# Patient Record
Sex: FEMALE | Race: WHITE | NOT HISPANIC OR LATINO | Employment: FULL TIME | URBAN - METROPOLITAN AREA
[De-identification: names, ages, dates, MRNs, and addresses within clinical notes are randomized per-mention and may not be internally consistent; named-entity substitution may affect disease eponyms.]

---

## 2021-03-08 ENCOUNTER — HOSPITAL ENCOUNTER (EMERGENCY)
Facility: HOSPITAL | Age: 31
Discharge: HOME | End: 2021-03-08
Attending: EMERGENCY MEDICINE
Payer: COMMERCIAL

## 2021-03-08 VITALS
RESPIRATION RATE: 20 BRPM | DIASTOLIC BLOOD PRESSURE: 71 MMHG | SYSTOLIC BLOOD PRESSURE: 109 MMHG | OXYGEN SATURATION: 99 % | HEART RATE: 89 BPM | TEMPERATURE: 98.4 F

## 2021-03-08 DIAGNOSIS — M79.5 FOREIGN BODY (FB) IN SOFT TISSUE: ICD-10-CM

## 2021-03-08 DIAGNOSIS — L03.90 CELLULITIS: Primary | ICD-10-CM

## 2021-03-08 PROCEDURE — 99284 EMERGENCY DEPT VISIT MOD MDM: CPT | Performed by: EMERGENCY MEDICINE

## 2021-03-08 PROCEDURE — 99283 EMERGENCY DEPT VISIT LOW MDM: CPT

## 2021-03-08 PROCEDURE — 10120 INC&RMVL FB SUBQ TISS SMPL: CPT | Performed by: EMERGENCY MEDICINE

## 2021-03-08 RX ORDER — CEPHALEXIN 500 MG/1
500 CAPSULE ORAL 4 TIMES DAILY
Qty: 20 CAPSULE | Refills: 0 | Status: SHIPPED | OUTPATIENT
Start: 2021-03-08 | End: 2021-03-13

## 2021-03-08 RX ORDER — ETONOGESTREL AND ETHINYL ESTRADIOL 11.7; 2.7 MG/1; MG/1
1 INSERT, EXTENDED RELEASE VAGINAL
COMMUNITY

## 2021-03-08 RX ORDER — LIDOCAINE HYDROCHLORIDE 10 MG/ML
1 INJECTION, SOLUTION EPIDURAL; INFILTRATION; INTRACAUDAL; PERINEURAL ONCE
Status: COMPLETED | OUTPATIENT
Start: 2021-03-08 | End: 2021-03-08

## 2021-03-08 RX ORDER — FLUOXETINE HYDROCHLORIDE 20 MG/1
20 CAPSULE ORAL DAILY
COMMUNITY
Start: 2021-03-05 | End: 2021-06-03

## 2021-03-08 RX ORDER — LIDOCAINE 40 MG/G
CREAM TOPICAL ONCE
Status: COMPLETED | OUTPATIENT
Start: 2021-03-08 | End: 2021-03-08

## 2021-03-08 RX ORDER — LORAZEPAM 0.5 MG/1
0.5 TABLET ORAL ONCE
Status: COMPLETED | OUTPATIENT
Start: 2021-03-08 | End: 2021-03-08

## 2021-03-08 RX ORDER — BACITRACIN, NEOMYCIN, POLYMYXIN B 400; 3.5; 5 [USP'U]/G; MG/G; [USP'U]/G
1 OINTMENT TOPICAL ONCE
Status: COMPLETED | OUTPATIENT
Start: 2021-03-08 | End: 2021-03-08

## 2021-03-08 RX ORDER — FLUOXETINE 10 MG/1
10 CAPSULE ORAL DAILY
COMMUNITY
Start: 2020-12-01

## 2021-03-08 RX ORDER — CEPHALEXIN 250 MG/1
500 CAPSULE ORAL ONCE
Status: COMPLETED | OUTPATIENT
Start: 2021-03-08 | End: 2021-03-08

## 2021-03-08 RX ADMIN — LIDOCAINE HYDROCHLORIDE 1 ML: 10 INJECTION, SOLUTION EPIDURAL; INFILTRATION; INTRACAUDAL; PERINEURAL at 02:52

## 2021-03-08 RX ADMIN — LIDOCAINE: 4 CREAM TOPICAL at 02:52

## 2021-03-08 RX ADMIN — LORAZEPAM 0.5 MG: 0.5 TABLET ORAL at 03:18

## 2021-03-08 RX ADMIN — BACITRACIN, NEOMYCIN, POLYMYXIN B 1 SMALL APPLICATION: 400; 3.5; 5 OINTMENT TOPICAL at 04:03

## 2021-03-08 RX ADMIN — CEPHALEXIN 500 MG: 250 CAPSULE ORAL at 04:02

## 2021-03-08 NOTE — DISCHARGE INSTRUCTIONS
Wash wound twice daily with gentle soap and water  Apply bacitracin or neosporin twice daily  Keep covered with bandaid/gauze if possible  Get reevaluated in 3-5 days for wound check, sooner if you feel symptoms are worsening

## 2024-06-15 ENCOUNTER — HOSPITAL ENCOUNTER (EMERGENCY)
Facility: HOSPITAL | Age: 34
Discharge: HOME | End: 2024-06-15
Attending: EMERGENCY MEDICINE | Admitting: EMERGENCY MEDICINE
Payer: COMMERCIAL

## 2024-06-15 VITALS
RESPIRATION RATE: 18 BRPM | DIASTOLIC BLOOD PRESSURE: 75 MMHG | SYSTOLIC BLOOD PRESSURE: 127 MMHG | TEMPERATURE: 98.2 F | HEART RATE: 80 BPM | OXYGEN SATURATION: 95 %

## 2024-06-15 DIAGNOSIS — Z77.098 CHEMICAL EXPOSURE OF EYE: Primary | ICD-10-CM

## 2024-06-15 PROCEDURE — 99283 EMERGENCY DEPT VISIT LOW MDM: CPT

## 2024-06-15 PROCEDURE — 99284 EMERGENCY DEPT VISIT MOD MDM: CPT | Performed by: PHYSICIAN ASSISTANT

## 2024-06-15 RX ORDER — ERYTHROMYCIN 5 MG/G
0.5 OINTMENT OPHTHALMIC ONCE
Status: COMPLETED | OUTPATIENT
Start: 2024-06-15 | End: 2024-06-15

## 2024-06-15 RX ORDER — TETRACAINE HYDROCHLORIDE 5 MG/ML
1 SOLUTION OPHTHALMIC ONCE
Status: COMPLETED | OUTPATIENT
Start: 2024-06-15 | End: 2024-06-15

## 2024-06-15 RX ADMIN — FLUORESCEIN SODIUM 1 STRIP: 1 STRIP OPHTHALMIC at 19:07

## 2024-06-15 RX ADMIN — ERYTHROMYCIN 0.5 INCH: 5 OINTMENT OPHTHALMIC at 19:29

## 2024-06-15 RX ADMIN — TETRACAINE HYDROCHLORIDE 1 DROP: 5 SOLUTION OPHTHALMIC at 19:07

## 2024-06-15 NOTE — ED PROVIDER NOTES
History  Chief Complaint   Patient presents with    Eye Pain     Patient presents to ED c/o left eye pain and redness after getting castor oil in eye while trying to take extensions out.      Patient is a 34 yo wf with history of anxiety who reports b/l eye irritation described as burning sensation, L >R, beginning last night. States she was using castor oil to take off eyelid extensions when she may have gotten castor oil in the eyes. States she has been irrigating intermittently since. Wears contact lenses but has not since this occurred. No visual disturbance. No photophobia.         Prior to Admission Medications   Prescriptions Last Dose Informant Patient Reported? Taking?   FLUoxetine (PROzac) 10 mg capsule   Yes No   Sig: Take 10 mg by mouth daily 1 po qd in addition to 20 mg pill   etonogestrel-ethinyl estradiol (NUVARING) 0.12-0.015 MG/24HR vaginal ring   Yes No   Sig: Insert 1 each into the vagina every 21 days      Facility-Administered Medications: None       Past Medical History:   Diagnosis Date    Anxiety        Past Surgical History:   Procedure Laterality Date    DILATION AND CURETTAGE OF UTERUS  2024       History reviewed. No pertinent family history.  I have reviewed and agree with the history as documented.    E-Cigarette/Vaping    E-Cigarette Use Never User      E-Cigarette/Vaping Substances    Nicotine No     THC No     CBD No     Flavoring No     Other No     Unknown No      Social History     Tobacco Use    Smoking status: Never    Smokeless tobacco: Never   Vaping Use    Vaping status: Never Used   Substance Use Topics    Alcohol use: Yes     Comment: social    Drug use: Not Currently       Review of Systems   Constitutional:  Negative for fever.   Eyes:  Positive for redness. Negative for photophobia, discharge, itching and visual disturbance.       Physical Exam  Physical Exam  Vitals and nursing note reviewed.   Constitutional:       General: She is not in acute distress.     Appearance:  Normal appearance. She is not ill-appearing, toxic-appearing or diaphoretic.      Comments: Anxious    HENT:      Head: Normocephalic and atraumatic.      Right Ear: Tympanic membrane, ear canal and external ear normal.      Left Ear: Tympanic membrane and ear canal normal.      Nose: Nose normal.      Mouth/Throat:      Mouth: Mucous membranes are moist.      Pharynx: Oropharynx is clear.   Eyes:      Extraocular Movements: Extraocular movements intact.      Pupils: Pupils are equal, round, and reactive to light.      Comments: Mildly injected L conjuctiva  Neg fluorescein uptake b/l  pH: 7.4 both eyes.    Cardiovascular:      Rate and Rhythm: Normal rate and regular rhythm.      Heart sounds: Normal heart sounds.   Pulmonary:      Effort: Pulmonary effort is normal.      Breath sounds: Normal breath sounds.   Musculoskeletal:      Cervical back: Normal range of motion and neck supple.   Neurological:      Mental Status: She is alert.         Vital Signs  ED Triage Vitals [06/15/24 1849]   Temperature Pulse Respirations Blood Pressure SpO2   98.2 °F (36.8 °C) 80 18 127/75 95 %      Temp Source Heart Rate Source Patient Position - Orthostatic VS BP Location FiO2 (%)   Oral Monitor Sitting Right arm --      Pain Score       --           Vitals:    06/15/24 1849   BP: 127/75   Pulse: 80   Patient Position - Orthostatic VS: Sitting         Visual Acuity  Visual Acuity      Flowsheet Row Most Recent Value   Visual acuity R eye is 20/20   Visual acuity Left eye is 20/20   Visual acuity in both eyes is 20/20   Wearing corrective eyewear/lenses? Yes            ED Medications  Medications   fluorescein sodium sterile ophthalmic strip 1 strip (1 strip Both Eyes Given 6/15/24 1907)   tetracaine 0.5 % ophthalmic solution 1 drop (1 drop Both Eyes Given 6/15/24 1907)   erythromycin (ILOTYCIN) 0.5 % ophthalmic ointment 0.5 inch (0.5 inches Both Eyes Given 6/15/24 1929)       Diagnostic Studies  Results Reviewed       None                    No orders to display              Procedures  Procedures         ED Course                               SBIRT 20yo+      Flowsheet Row Most Recent Value   Initial Alcohol Screen: US AUDIT-C     1. How often do you have a drink containing alcohol? 0 Filed at: 06/15/2024 1847   2. How many drinks containing alcohol do you have on a typical day you are drinking?  0 Filed at: 06/15/2024 1847   3a. Male UNDER 65: How often do you have five or more drinks on one occasion? 0 Filed at: 06/15/2024 1847   3b. FEMALE Any Age, or MALE 65+: How often do you have 4 or more drinks on one occassion? 0 Filed at: 06/15/2024 1847   Audit-C Score 0 Filed at: 06/15/2024 1847   MANUEL: How many times in the past year have you...    Used an illegal drug or used a prescription medication for non-medical reasons? Never Filed at: 06/15/2024 1847                      Medical Decision Making  Differential diagnosis includes chemical conjunctivitis, corneal abrasion  Patient with negative fluorescein uptake bilaterally.  pH; 7.4 each eye. Each eye was irrigated with 500 cc sterile saline. Plan will be erythromycin ophthalmic ointment bilaterally.  Advised follow-up with ophthalmologist of her choice or with Dr. Barboza Monday if no improvement.  Return precautions reviewed    Risk  Prescription drug management.             Disposition  Final diagnoses:   Chemical exposure of eye     Time reflects when diagnosis was documented in both MDM as applicable and the Disposition within this note       Time User Action Codes Description Comment    6/15/2024  7:27 PM Charles Mccracken Add [Z77.098] Chemical exposure of eye           ED Disposition       ED Disposition   Discharge    Condition   Stable    Date/Time   Sat Alverto 15, 2024 1927    Comment   Mireille Painting discharge to home/self care.                   Follow-up Information       Follow up With Specialties Details Why Contact Info    Paras Barboza MD Ophthalmology   [address]  [address] [address]  [phone]              Discharge Medication List as of 6/15/2024  7:29 PM        CONTINUE these medications which have NOT CHANGED    Details   etonogestrel-ethinyl estradiol (NUVARING) 0.12-0.015 MG/24HR vaginal ring Insert 1 each into the vagina every 21 days, Historical Med      FLUoxetine (PROzac) 10 mg capsule Take 10 mg by mouth daily 1 po qd in addition to 20 mg pill, Starting Tue 12/1/2020, Historical Med             No discharge procedures on file.    PDMP Review       None            ED Provider  Electronically Signed by             Charles Mccracken PA-C  06/15/24 2002

## 2024-06-15 NOTE — DISCHARGE INSTRUCTIONS
Erythromycin ophthalmic ointment 1/2 inch ribbon to lower eyelid bilaterally    Follow up with eye doctor of your choice or with DR Barboza Monday if no improvement    Return to ED for increased pain, worsening symptoms